# Patient Record
Sex: MALE | Race: WHITE | URBAN - METROPOLITAN AREA
[De-identification: names, ages, dates, MRNs, and addresses within clinical notes are randomized per-mention and may not be internally consistent; named-entity substitution may affect disease eponyms.]

---

## 2024-11-08 ENCOUNTER — OFFICE VISIT (OUTPATIENT)
Dept: ORTHOPEDIC SURGERY | Age: 29
End: 2024-11-08
Payer: COMMERCIAL

## 2024-11-08 VITALS — HEIGHT: 73 IN | BODY MASS INDEX: 27.7 KG/M2 | WEIGHT: 209 LBS

## 2024-11-08 DIAGNOSIS — M25.561 PAIN IN BOTH KNEES, UNSPECIFIED CHRONICITY: Primary | ICD-10-CM

## 2024-11-08 DIAGNOSIS — M25.562 PAIN IN BOTH KNEES, UNSPECIFIED CHRONICITY: Primary | ICD-10-CM

## 2024-11-08 PROCEDURE — 99203 OFFICE O/P NEW LOW 30 MIN: CPT | Performed by: PHYSICIAN ASSISTANT

## 2024-11-08 PROCEDURE — M5037 MISC FLUK BRACE: HCPCS | Performed by: PHYSICIAN ASSISTANT

## 2024-11-08 RX ORDER — DICLOFENAC SODIUM 75 MG/1
75 TABLET, DELAYED RELEASE ORAL 2 TIMES DAILY
Qty: 60 TABLET | Refills: 1 | Status: SHIPPED | OUTPATIENT
Start: 2024-11-08

## 2024-11-08 NOTE — PROGRESS NOTES
Name: Kirby Singleton  YOB: 1995  Gender: male  MRN: [id number]    CC: Knee Pain (BL)     HPI: Kirby Singleton is a 29 y.o. male who presents with Knee Pain (BL)  He has had pain in both knee since 2017. He was in the  and is an avid weight . He notices swelling and pain in the front of both knees after activity. He notices pain in the front of the knees right under the kneecap and increases with weight bearing. He has had several rounds of physical therapy over the years with no improvement.  He notes his physical therapy was through the  in [place].  He was also given anti-inflammatories while in the  but has not seen improvement.  He went to the ER in May 2024 after an injury playing basketball. He went up for a rebound and was knocked down. He landed with his right leg under neath him. His main concern is that he has a lot of pain and swelling for days following activity.  He tried to get an MRI of this knee but his insurance was not taken at a place [facility].  He is here today for further evaluation bilateral knee pain.    ROS/Meds/PSH/PMH/FH/SH: I personally reviewed the patients standard intake form.  Below are the pertinents    Tobacco:  reports that he has never smoked. He has never used smokeless tobacco.  Diabetes: none  Other: none    Physical Examination:  General: no acute distress  Lungs: breathing easily  CV: regular rhythm by pulse  Right Knee : Tender to palpate along the patellar tendon insertion.  He has some tenderness on the quad tendon as well.  Stable to varus and valgus stress at full extension and 40 degrees of flexion.  He does have some pain with hyperextension.  He has pain with knee flexion past 90.  Range of motion 0 to about 120 degrees today.  Negative Lachman's.  Negative anterior drawer.  Negative posterior drawer.  No tenderness along the medial or lateral joint line.  All of his tenderness today is along the patellar tendon.

## 2024-11-08 NOTE — PROGRESS NOTES
Patient was fitted and instructed on bilateral Fluk Patella Straps. Patient read and signed documenting they understand and agree to [facility]'s current DME return policy.

## 2024-12-03 ENCOUNTER — TELEPHONE (OUTPATIENT)
Dept: ORTHOPEDIC SURGERY | Age: 29
End: 2024-12-03

## 2024-12-03 NOTE — TELEPHONE ENCOUNTER
MRI RIGHT KNEE APPROVAL     Case   Case Description: Knee MRI (right) Request ID:  Tracking: [id number]  [id number]         Request Date: 11/11/2024 09:42 AM Status: Approved   Entry Method: RadMD Validity Dates: 11/11/2024-12/11/2024    ICD10: M25.561  Contact Name: merna schofield  (Referring Provider)   Initial Determination Date: 12/03/2024 02:00 PM Email: [email or web address]   Final Determination Date: 12/03/2024 02:00 PM       Please be advised that all data was current as of Tuesday, December 03, 2024 at 2:15 PM [facility]   Radiology   Date of Service: 12/6/2024   Update       Expedited: No   Extension: Yes   CPT4: 64436 Billable Codes   Clinical Rcvd: 11/11/2024 - Clinical information received via fax or upload

## 2024-12-09 ENCOUNTER — HOSPITAL ENCOUNTER (OUTPATIENT)
Dept: PHYSICAL THERAPY | Age: 29
Setting detail: RECURRING SERIES
Discharge: HOME | End: 2024-12-12
Payer: COMMERCIAL

## 2024-12-09 DIAGNOSIS — M76.51 PATELLAR TENDINITIS, RIGHT KNEE: ICD-10-CM

## 2024-12-09 DIAGNOSIS — G89.29 CHRONIC PAIN OF BOTH KNEES: Primary | ICD-10-CM

## 2024-12-09 DIAGNOSIS — M25.561 CHRONIC PAIN OF BOTH KNEES: Primary | ICD-10-CM

## 2024-12-09 DIAGNOSIS — M25.562 CHRONIC PAIN OF BOTH KNEES: Primary | ICD-10-CM

## 2024-12-09 PROCEDURE — 97161 PT EVAL LOW COMPLEX 20 MIN: CPT

## 2024-12-09 ASSESSMENT — PAIN SCALES - GENERAL: PAINLEVEL_OUTOF10: 5

## 2024-12-09 NOTE — THERAPY EVALUATION
Test Initial: NT   Single Leg Balance Right 10 seconds  Left 30+ seconds            ASSESSMENT   Initial Assessment:    Kirby Singleton presents to physical therapy with decreased strength, ROM, joint mobility, functional mobility. These S/S are consistent with referring diagnosis. Patient will benefit from skilled physical therapy for manual therapeutic techniques (as appropriate), therapeutic exercises and activities, balance and comprehensive home exercises program to address current impairments and functional limitations.    Therapy Problem List: (Impacting functional limitations):    Increased Pain, Decreased Strength, Decreased ROM, Decreased Functional Mobility, Decreased [place] with Home Exercise Program, Decreased Posture, Decreased Balance, Decreased Body Mechanics, and Decreased Activity Tolerance/Endurance*   Therapy Prognosis:   Excellent     Initial Assessment Complexity:   Low Complexity     PLAN   Effective Dates: 12/9/2024 TO Plan of Care/Certification Expiration Date: 03/09/25     Interventions Planned (Treatment may consist of any combination of the following):    Balance Training, Functional Mobility Training, Home Exercise Program (HEP), Manual Therapy, Neuromuscular Re-education/Strengthening, Range of Motion (ROM), Return to Work-Related Activiity, Therapeutic Activites, Therapeutic Exercise/Strengthening, Safety Education and Training, and Dry Needling   Goals: (Goals have been discussed and agreed upon with patient.)    Goals: (Goals have been discussed and agreed upon with patient.) In Progress Goal Met  Goal Not met   Short-Term Functional Goals: Time Frame: 3 weeks      1.Kirby Singleton will be independent with HEP [] [] []   2.Kirby Snigleton will improve Bilateral knee flexion ROM to 140 [] [] []   3.Kirby Singleton will improve Bilateral knee extension ROM to hyper 2 [] [] []         Discharge Goals: Time Frame: 6 weeks      1.Kirby Singleton will be able to Squat to 90 deg

## 2024-12-09 NOTE — PROGRESS NOTES
Kirby Singleton  : 1995  Primary: [employer])  Secondary:  [facility] @ [place] [address] [place] [address]  Phone: [phone]  Fax: [phone] Plan Frequency: 2 x a week for 6-8 weeks  Plan of Care/Certification Expiration Date: 25        Plan of Care/Certification Expiration Date:  Plan of Care/Certification Expiration Date: 25    Frequency/Duration: Plan Frequency: 2 x a week for 6-8 weeks      Time In/Out:   Time In: 1300  Time Out: 1400    PT Visit Info:    Plan Frequency: 2 x a week for 6-8 weeks      Visit Count:  1    OUTPATIENT PHYSICAL THERAPY:   Treatment Note 2024       Charge Capture   Episode  (Bilateral knee pain)               Treatment Diagnosis:    Chronic pain of both knees  Patellar tendinitis, right knee  Medical/Referring Diagnosis:    Pain in both knees, unspecified chronicity [M25.561, M25.562]    Referring Physician:  Rita Camara PA MD Orders:  PT Eval and Treat   Return MD Appt:  2024   Date of Onset:  Onset Date:  ()     Allergies:   Patient has no known allergies.  Restrictions/Precautions:   None      Interventions Planned (Treatment may consist of any combination of the following):     See Assessment Note      Subjective Comments:     See Eval  Initial Pain Level::     5/10  Post Session Pain Level:       3/10  Medications Last Reviewed:  2024  Updated Objective Findings:  See Evaluation Note from today  Treatment   THERAPEUTIC EXERCISE: (Eval only minutes):    Exercises per grid below to improve mobility, strength, balance, coordination, and dynamic movement of generalized knee to improve functional bending, lifting, and carrying.  Required minimal visual, verbal, and manual cues to promote proper body alignment, promote proper body posture, and promote proper body mechanics.  Progressed resistance, range, repetitions, and complexity of movement as indicated.

## 2024-12-12 ENCOUNTER — OFFICE VISIT (OUTPATIENT)
Dept: ORTHOPEDIC SURGERY | Age: 29
End: 2024-12-12
Payer: COMMERCIAL

## 2024-12-12 DIAGNOSIS — M76.51 PATELLAR TENDINITIS OF RIGHT KNEE: Primary | ICD-10-CM

## 2024-12-12 PROCEDURE — 99213 OFFICE O/P EST LOW 20 MIN: CPT | Performed by: PHYSICIAN ASSISTANT

## 2024-12-12 RX ORDER — DICLOFENAC SODIUM 75 MG/1
75 TABLET, DELAYED RELEASE ORAL 2 TIMES DAILY
Qty: 60 TABLET | Refills: 2 | Status: SHIPPED | OUTPATIENT
Start: 2024-12-12

## 2024-12-12 NOTE — PROGRESS NOTES
Name: Kirby Singleton  YOB: 1995  Gender: male  MRN: [id number]    CC: Knee Pain (R)     HPI: Kirby Singleton is a 29 y.o. male who returns for follow up and MRI results on right knee.  He states the patellar tendon straps as well as the medication helped with his pain significantly.     Recap: He has had pain for years but exacerbated this pain after a fall in May.  When the swelling and the pain had not gotten better he saw me in November for further evaluation of this right knee pain.  I sent in for an MRI since he been dealing with this knee pain for years.    Physical Examination:  General: no acute distress  Lungs: breathing easily  CV: regular rhythm by pulse  Right Knee: Continues have tenderness over the patellar tendon.  He states it is that better today than it was before.  No significant swelling noted today.  Ligamentously stable x 4.  Can perform straight leg raise without pain.  No tenderness along the quad tendon.  No tenderness on the medial lateral joint line.  Negative Andrey's.  Calf is soft nontender palpation.  Dorsi and plantarflexion makes intact.    Imaging:     MRI: Slight joint effusion noted.  PCL and ACL intact.  Medial and lateral menisci intact.  There is a significant swelling around the proximal patellar tendon as well as some intrasubstance signaling present.  No bone marrow contusion noted.  No signaling noted along the quad tendon.  No other findings noted.    All imaging interpreted by myself Rita Camara PA-C independent of radiology review    Assessment:     ICD-10-CM    1. Patellar tendinitis of right knee  M76.51 diclofenac (VOLTAREN) 75 MG EC tablet     Amb External Referral To Physical Therapy         Plan:     Discussed with Mr. Singleton today and I am glad the therapy and the patellar straps as well as muscular help.  I do think continuing formal physical therapy will help.  We discussed the MRI today does look like he has some significant proximal

## 2024-12-26 ENCOUNTER — HOSPITAL ENCOUNTER (OUTPATIENT)
Dept: PHYSICAL THERAPY | Age: 29
Setting detail: RECURRING SERIES
End: 2024-12-26
Payer: COMMERCIAL